# Patient Record
Sex: FEMALE | Race: WHITE
[De-identification: names, ages, dates, MRNs, and addresses within clinical notes are randomized per-mention and may not be internally consistent; named-entity substitution may affect disease eponyms.]

---

## 2017-02-14 ENCOUNTER — HOSPITAL ENCOUNTER (OUTPATIENT)
Dept: HOSPITAL 47 - RADMAMWWP | Age: 52
Discharge: HOME | End: 2017-02-14
Payer: COMMERCIAL

## 2017-02-14 DIAGNOSIS — Z12.31: Primary | ICD-10-CM

## 2017-02-15 NOTE — MM
Reason for exam: screening  (asymptomatic).

Last mammogram was performed 9 years ago.



History:

Took hormonal contraceptives for 3 years beginning at age 15.



Physical Findings:

A clinical breast exam by your physician is recommended on an annual basis and 

results should be correlated with mammographic findings.



MG Screening Mammo w CAD

Bilateral CC and MLO view(s) were taken.

Prior study comparison: February 20, 2008, CAD bilateral diagnostic mammogram.

The breast tissue is heterogeneously dense. This may lower the sensitivity of 

mammography.  No significant changes when compared with prior studies.





ASSESSMENT: Benign, BI-RAD 2



RECOMMENDATION:

Routine screening mammogram of both breasts in 1 year.

## 2017-02-24 ENCOUNTER — HOSPITAL ENCOUNTER (OUTPATIENT)
Dept: HOSPITAL 47 - LABWHC1 | Age: 52
Discharge: HOME | End: 2017-02-24
Payer: COMMERCIAL

## 2017-02-24 DIAGNOSIS — E78.5: ICD-10-CM

## 2017-02-24 DIAGNOSIS — E55.9: ICD-10-CM

## 2017-02-24 DIAGNOSIS — G60.9: Primary | ICD-10-CM

## 2017-02-24 DIAGNOSIS — M79.7: ICD-10-CM

## 2017-02-24 LAB
ALP SERPL-CCNC: 87 U/L (ref 38–126)
ALT SERPL-CCNC: 18 U/L (ref 9–52)
ANION GAP SERPL CALC-SCNC: 11 MMOL/L
AST SERPL-CCNC: 22 U/L (ref 14–36)
BASOPHILS # BLD AUTO: 0.1 K/UL (ref 0–0.2)
BASOPHILS NFR BLD AUTO: 1 %
BUN SERPL-SCNC: 9 MG/DL (ref 7–17)
CALCIUM SPEC-MCNC: 9.2 MG/DL (ref 8.4–10.2)
CH: 30
CHCM: 33.5
CHLORIDE SERPL-SCNC: 100 MMOL/L (ref 98–107)
CHOLEST SERPL-MCNC: 235 MG/DL (ref ?–200)
CO2 SERPL-SCNC: 29 MMOL/L (ref 22–30)
EOSINOPHIL # BLD AUTO: 0.1 K/UL (ref 0–0.7)
EOSINOPHIL NFR BLD AUTO: 1 %
ERYTHROCYTE [DISTWIDTH] IN BLOOD BY AUTOMATED COUNT: 4.68 M/UL (ref 3.8–5.4)
ERYTHROCYTE [DISTWIDTH] IN BLOOD: 12.2 % (ref 11.5–15.5)
GLUCOSE SERPL-MCNC: 89 MG/DL (ref 74–99)
HCT VFR BLD AUTO: 42.1 % (ref 34–46)
HDLC SERPL-MCNC: 49 MG/DL (ref 40–60)
HDW: 2.39
HEPATITIS C VIRUS IGG  INDEX: 0.01
HGB BLD-MCNC: 14.1 GM/DL (ref 11.4–16)
LUC NFR BLD AUTO: 1 %
LYMPHOCYTES # SPEC AUTO: 2.8 K/UL (ref 1–4.8)
LYMPHOCYTES NFR SPEC AUTO: 30 %
MCH RBC QN AUTO: 30 PG (ref 25–35)
MCHC RBC AUTO-ENTMCNC: 33.4 G/DL (ref 31–37)
MCV RBC AUTO: 90 FL (ref 80–100)
MONOCYTES # BLD AUTO: 0.5 K/UL (ref 0–1)
MONOCYTES NFR BLD AUTO: 6 %
NEUTROPHILS # BLD AUTO: 5.7 K/UL (ref 1.3–7.7)
NEUTROPHILS NFR BLD AUTO: 61 %
NON-AFRICAN AMERICAN GFR(MDRD): >60
POTASSIUM SERPL-SCNC: 4 MMOL/L (ref 3.5–5.1)
PROT SERPL-MCNC: 7.1 G/DL (ref 6.3–8.2)
SODIUM SERPL-SCNC: 140 MMOL/L (ref 137–145)
TRIGL SERPL-MCNC: 211 MG/DL (ref ?–150)
WBC # BLD AUTO: 0.12 10*3/UL
WBC # BLD AUTO: 9.2 K/UL (ref 3.8–10.6)
WBC (PEROX): 9.59

## 2017-02-24 PROCEDURE — 36415 COLL VENOUS BLD VENIPUNCTURE: CPT

## 2017-02-24 PROCEDURE — 84443 ASSAY THYROID STIM HORMONE: CPT

## 2017-02-24 PROCEDURE — 80061 LIPID PANEL: CPT

## 2017-02-24 PROCEDURE — 86803 HEPATITIS C AB TEST: CPT

## 2017-02-24 PROCEDURE — 80053 COMPREHEN METABOLIC PANEL: CPT

## 2017-02-24 PROCEDURE — 85025 COMPLETE CBC W/AUTO DIFF WBC: CPT

## 2017-02-24 PROCEDURE — 82306 VITAMIN D 25 HYDROXY: CPT

## 2019-03-29 ENCOUNTER — HOSPITAL ENCOUNTER (OUTPATIENT)
Dept: HOSPITAL 47 - LABWHC1 | Age: 54
Discharge: HOME | End: 2019-03-29
Attending: INTERNAL MEDICINE
Payer: COMMERCIAL

## 2019-03-29 DIAGNOSIS — E78.5: Primary | ICD-10-CM

## 2019-03-29 DIAGNOSIS — E55.9: ICD-10-CM

## 2019-03-29 LAB
ALBUMIN SERPL-MCNC: 4 G/DL (ref 3.8–4.9)
ALBUMIN/GLOB SERPL: 1.74 G/DL (ref 1.6–3.17)
ALP SERPL-CCNC: 167 U/L (ref 41–126)
ALT SERPL-CCNC: 20 U/L (ref 8–44)
ANION GAP SERPL CALC-SCNC: 8.1 MMOL/L (ref 4–12)
AST SERPL-CCNC: 28 U/L (ref 13–35)
BASOPHILS # BLD AUTO: 0 K/UL (ref 0–0.2)
BASOPHILS NFR BLD AUTO: 0 %
BUN SERPL-SCNC: 7 MG/DL (ref 9–27)
CALCIUM SPEC-MCNC: 9 MG/DL (ref 8.7–10.3)
CHLORIDE SERPL-SCNC: 102 MMOL/L (ref 96–109)
CHOLEST SERPL-MCNC: 221 MG/DL (ref 0–200)
CO2 SERPL-SCNC: 28.9 MMOL/L (ref 21.6–31.8)
EOSINOPHIL # BLD AUTO: 0.3 K/UL (ref 0–0.7)
EOSINOPHIL NFR BLD AUTO: 3 %
ERYTHROCYTE [DISTWIDTH] IN BLOOD BY AUTOMATED COUNT: 4.27 M/UL (ref 3.8–5.4)
ERYTHROCYTE [DISTWIDTH] IN BLOOD: 13.5 % (ref 11.5–15.5)
GLOBULIN SER CALC-MCNC: 2.3 G/DL (ref 1.6–3.3)
GLUCOSE SERPL-MCNC: 139 MG/DL (ref 70–110)
HCT VFR BLD AUTO: 40.7 % (ref 34–46)
HDLC SERPL-MCNC: 52 MG/DL (ref 40–60)
HGB BLD-MCNC: 13.2 GM/DL (ref 11.4–16)
LDLC SERPL CALC-MCNC: 134 MG/DL (ref 0–131)
LYMPHOCYTES # SPEC AUTO: 2.3 K/UL (ref 1–4.8)
LYMPHOCYTES NFR SPEC AUTO: 30 %
MCH RBC QN AUTO: 30.8 PG (ref 25–35)
MCHC RBC AUTO-ENTMCNC: 32.3 G/DL (ref 31–37)
MCV RBC AUTO: 95.5 FL (ref 80–100)
MONOCYTES # BLD AUTO: 0.3 K/UL (ref 0–1)
MONOCYTES NFR BLD AUTO: 4 %
NEUTROPHILS # BLD AUTO: 4.6 K/UL (ref 1.3–7.7)
NEUTROPHILS NFR BLD AUTO: 61 %
PLATELET # BLD AUTO: 532 K/UL (ref 150–450)
POTASSIUM SERPL-SCNC: 3.9 MMOL/L (ref 3.5–5.5)
PROT SERPL-MCNC: 6.3 G/DL (ref 6.2–8.2)
SODIUM SERPL-SCNC: 139 MMOL/L (ref 135–145)
TRIGL SERPL-MCNC: 175 MG/DL (ref 0–149)
VLDLC SERPL CALC-MCNC: 35 MG/DL (ref 5–40)
WBC # BLD AUTO: 7.6 K/UL (ref 3.8–10.6)

## 2019-03-29 PROCEDURE — 36415 COLL VENOUS BLD VENIPUNCTURE: CPT

## 2019-03-29 PROCEDURE — 80061 LIPID PANEL: CPT

## 2019-03-29 PROCEDURE — 82306 VITAMIN D 25 HYDROXY: CPT

## 2019-03-29 PROCEDURE — 80053 COMPREHEN METABOLIC PANEL: CPT

## 2019-03-29 PROCEDURE — 85025 COMPLETE CBC W/AUTO DIFF WBC: CPT

## 2020-03-17 ENCOUNTER — HOSPITAL ENCOUNTER (OUTPATIENT)
Dept: HOSPITAL 47 - RADMRIMAIN | Age: 55
Discharge: HOME | End: 2020-03-17
Attending: PSYCHIATRY & NEUROLOGY
Payer: COMMERCIAL

## 2020-03-17 DIAGNOSIS — M50.30: Primary | ICD-10-CM

## 2020-03-17 PROCEDURE — 72141 MRI NECK SPINE W/O DYE: CPT

## 2020-03-17 NOTE — MR
EXAMINATION TYPE: MR cervical spine wo con

 

DATE OF EXAM: 3/17/2020

 

COMPARISON: None

 

HISTORY: Neck pain

 

TECHNIQUE: 

Multiplanar, multisequence images of the cervical spine were acquired.

 

C2-C3: Uncovertebral joint hypertrophy causes some left-sided foraminal encroachment. No significant 
spinal stenosis. No disc herniation.

 

C3-C4: Left-sided foraminal encroachment is present due to uncovertebral joint hypertrophy and facet 
arthropathy. There is posterior extension endplate disc complex causing anterior mass effect on the t
hecal sac and possibly contacting the anterior cervical cord, only mild spinal stenosis.

 

C4-C5: Posterior broad-based disc bulge causes mild anterior mass effect on the thecal sac. No signif
icant spinal stenosis or foraminal encroachment.

 

C5-C6: Bilateral foraminal encroachment is present. Posterior extension endplate disc complex does ca
use some mild anterior mass effect on the thecal sac, only mild spinal stenosis.

 

C6-C7: Bilateral foraminal encroachment is present. There is a Chiba joint hypertrophy and facet arth
ropathy causing left-sided foraminal encroachment greater than right. Posterior extension endplate di
sc complex results in mild spinal stenosis, effacement of the anterior thecal sac.

 

C7-T1: No evidence for degenerative disc disease.  No disc bulge/herniation or protrusion.  No Canal 
stenosis.  Foramina are patent bilaterally.

 

Cervical segments are intact.  There is normal alignment.  Cervical spinal cord is of normal signal. 
 Craniovertebral junction relationships are within normal limits.  There is multilevel spondylosis an
d motion on the exam. Endplate discogenic marrow signal changes are present, there is loss of disc he
ight signal at intervertebral levels with relative sparing at C7-T1. There is a spinal curvature. Cys
tic lesion possibly present within the right and likely left lobe lobe of the thyroid. Inflammatory c
hanges are present within the bilateral maxillary sinuses.

 

IMPRESSION:

Degenerative disc disease, multilevel foraminal encroachment.

## 2022-07-13 ENCOUNTER — HOSPITAL ENCOUNTER (OUTPATIENT)
Dept: HOSPITAL 47 - RADXRMAIN | Age: 57
Discharge: HOME | End: 2022-07-13
Attending: FAMILY MEDICINE
Payer: COMMERCIAL

## 2022-07-13 DIAGNOSIS — K59.00: Primary | ICD-10-CM

## 2022-07-13 PROCEDURE — 74018 RADEX ABDOMEN 1 VIEW: CPT

## 2022-07-13 NOTE — XR
EXAMINATION TYPE: XR abdomen 1V

 

DATE OF EXAM: 7/13/2022 12:42 PM

 

CLINICAL HISTORY:  Lower abdominal pain.

 

TECHNIQUE:  3 supine KUB images of the abdomen are obtained.

 

COMPARISON: Chest x-ray January 5, 2016.

 

FINDINGS: Some paucity of bowel gas. Gas seen in nondistended bowel loops in the pelvis. Gas noted wi
thin prominent colon along the periphery including right transverse and left colon. Not well-seen sig
moid or rectal colon. Possible soft tissue mass with shouldering in the proximal transverse colon. Na
rrowing and sclerosis at pubic symphysis. No abnormal calcifications.

 

IMPRESSION: 

 

Overall nonspecific bowel gas pattern. One must consider possible colonic mass or neoplasm. Correlate
 clinically. Consider CT abdomen and pelvis to further evaluate.

## 2022-09-16 ENCOUNTER — HOSPITAL ENCOUNTER (OUTPATIENT)
Dept: HOSPITAL 47 - RADCTMAIN | Age: 57
Discharge: HOME | End: 2022-09-16
Attending: FAMILY MEDICINE
Payer: COMMERCIAL

## 2022-09-16 DIAGNOSIS — K42.9: ICD-10-CM

## 2022-09-16 DIAGNOSIS — K80.20: Primary | ICD-10-CM

## 2022-09-16 DIAGNOSIS — K40.30: ICD-10-CM

## 2022-09-16 LAB — BUN SERPL-SCNC: 8 MG/DL (ref 7–17)

## 2022-09-16 PROCEDURE — 36415 COLL VENOUS BLD VENIPUNCTURE: CPT

## 2022-09-16 PROCEDURE — 74177 CT ABD & PELVIS W/CONTRAST: CPT

## 2022-09-16 PROCEDURE — 82565 ASSAY OF CREATININE: CPT

## 2022-09-16 PROCEDURE — 84520 ASSAY OF UREA NITROGEN: CPT

## 2022-09-18 NOTE — CT
EXAMINATION TYPE: CT abdomen pelvis w con

CT DLP: 452.4 mGycm, Automated exposure control for dose reduction was used.

 

DATE OF EXAM: 9/16/2022 5:42 PM

 

COMPARISON:  CT abdomen pelvis most recent from 7/30/2010 

 

CLINICAL INDICATION:Female, 57 years old with history of R19.09 INTRA ABD PELVIC SWALLOW MASS LUMP; I
NTRA ABD PELVIC SWELLING MASS LUMP

 

TECHNIQUE:  Axial CT of the abdomen and pelvis. Sagittal and coronal reformats were created on a [employer] workstation. 

 

Contrast used:100ML mL of Isovue 300 with IV Contrast, 

Oral contrast used: with Oral Contrast 

 

FINDINGS: 

LOWER CHEST: Unremarkable

 

ABDOMEN

LIVER: Scattered hepatic cysts are present

GALLBLADDER AND BILE DUCTS: Layering increased densities within the lumen consistent with gallstones 
are present. Extra hepatic biliary dilation with the common bile duct measuring up to 10 mm which is 
increased from 6 mm in 2010. Mild intrahepatic biliary dilatation is also present.

PANCREAS: There is pancreatic divisum morphology. No obvious obstructing mass of the

SPLEEN: Unremarkable.

ADRENAL GLANDS: Unremarkable.

KIDNEYS AND URETERS: No evidence of hydronephrosis or renal calculus. Medial right upper kidney cyst.


 

PELVIS

BLADDER: Unremarkable

REPRODUCTIVE: The uterus is surgically absent.

 

ABDOMEN & PELVIS

STOMACH AND BOWEL: No evidence of bowel obstruction. Appendix is normal.

PERITONEUM: No evidence of pneumoperitoneum or free fluid.

 

VASCULATURE: No evidence of aortic aneurysm. 

MUSCULOSKELETAL: No acute osseous abnormalities multilevel disc degeneration changes with disc bulgin
g throughout the lumbar spine.

LYMPH NODES: No gross evidence for lymphadenopathy.

SOFT TISSUE/ABDOMINAL WALL: Left fat containing inguinal hernia. Fat-containing umbilical hernia.

 

IMPRESSION:

1.  No evidence for acute intra-abdominal process. No evidence for intraluminal mass.

2. Extra hepatic biliary dilation which has increased from 2010, additionally there is intrahepatic b
iliary dilation which is similar to 2010. Cholelithiasis is present. No obvious choledocholithiasis i
dentified. MRCP could be used to evaluate biliary system.

3. Pancreatic divisum suggested.

4. Left inguinal fat-containing hernia.

5. Fat-containing umbilical hernia.

## 2023-06-02 ENCOUNTER — HOSPITAL ENCOUNTER (OUTPATIENT)
Dept: HOSPITAL 47 - RADMAMWWP | Age: 58
Discharge: HOME | End: 2023-06-02
Attending: FAMILY MEDICINE
Payer: COMMERCIAL

## 2023-06-02 DIAGNOSIS — Z78.0: ICD-10-CM

## 2023-06-02 DIAGNOSIS — Z12.31: Primary | ICD-10-CM

## 2023-06-02 PROCEDURE — 77063 BREAST TOMOSYNTHESIS BI: CPT

## 2023-06-02 PROCEDURE — 77067 SCR MAMMO BI INCL CAD: CPT

## 2023-06-05 NOTE — MM
Reason for Exam: Screening  (asymptomatic). 

Last mammogram was performed 6 year(s) and 4 month(s) ago. 





Patient History: 

Menarche at age 11. First Full-Term Pregnancy at age 16. Left ovary removed at age 45. Right ovary

removed at age 45. Hysterectomy at age 45. Postmenopausal. Patient has history of breast feeding.

Hormonal Contraceptives for 3 years from age 15 until age 18. Benign Excisional Biopsy on the right

side. 





Risk Values: 

Shalonda 5 year model risk: 1.2%.

NCI Lifetime model risk: 6.3%.





Prior Study Comparison: 

2/20/2008 Bilateral Diagnostic Mammogram, [facility]. 2/14/2017 Bilateral Screening Mammogram, [facility]. 





Tissue Density: 

There are scattered fibroglandular densities.





Findings: 

Analyzed By CAD. 

Benign-appearing bilateral axillary lymph nodes are redemonstrated.



There is no suspicious group of microcalcifications or new suspicious mass in either breast. 





Overall Assessment: Negative, BI-RAD 1





Management: 

Screening Mammogram of both breasts in 1 year.

.



Patient should continue monthly self-breast exams.  A clinical breast exam by your physician is

recommended on an annual basis.

This exam should not preclude additional follow-up of suspicious palpable abnormalities.



Note on Shalonda scores and lifetime risk:

1. A Shalonda score greater than 3% is considered moderate risk. If this is the case, consider

specialist referral to assess eligibility for a risk reducing agent.

2. If overall lifetime risk for the development of breast cancer is 20% or higher, the patient may

qualify for future screening with alternating mammogram and breast MRI.



Electronically signed and approved by: Zenon Celestin M.D.

## 2023-09-13 ENCOUNTER — HOSPITAL ENCOUNTER (OUTPATIENT)
Dept: HOSPITAL 47 - ORWHC2ENDO | Age: 58
End: 2023-09-13
Attending: INTERNAL MEDICINE
Payer: COMMERCIAL

## 2023-09-13 VITALS — BODY MASS INDEX: 23.1 KG/M2

## 2023-09-13 VITALS — HEART RATE: 80 BPM | DIASTOLIC BLOOD PRESSURE: 76 MMHG | SYSTOLIC BLOOD PRESSURE: 106 MMHG

## 2023-09-13 VITALS — TEMPERATURE: 97 F

## 2023-09-13 VITALS — RESPIRATION RATE: 14 BRPM

## 2023-09-13 DIAGNOSIS — K20.90: Primary | ICD-10-CM

## 2023-09-13 DIAGNOSIS — F17.200: ICD-10-CM

## 2023-09-13 DIAGNOSIS — Z79.811: ICD-10-CM

## 2023-09-13 DIAGNOSIS — Z98.890: ICD-10-CM

## 2023-09-13 DIAGNOSIS — Z88.0: ICD-10-CM

## 2023-09-13 DIAGNOSIS — F31.9: ICD-10-CM

## 2023-09-13 DIAGNOSIS — G60.9: ICD-10-CM

## 2023-09-13 DIAGNOSIS — K22.70: ICD-10-CM

## 2023-09-13 PROCEDURE — 43239 EGD BIOPSY SINGLE/MULTIPLE: CPT

## 2023-09-13 PROCEDURE — 88305 TISSUE EXAM BY PATHOLOGIST: CPT

## 2023-09-13 PROCEDURE — 88312 SPECIAL STAINS GROUP 1: CPT

## 2023-09-13 NOTE — P.PCN
Date of Procedure: 09/13/23


Procedure(s) Performed: 


BRIEF HISTORY: Patient is a 58-year-old, pleasant white female scheduled for an 

upper endoscopy as a part of evaluation of nausea, decreased appetite and weight

loss of 10 pounds in the last 3 months.. 





PROCEDURE PERFORMED: Esophagogastroduodenoscopy with biopsy .





PREOPERATIVE DIAGNOSIS: .nausea/epigastric discomfort/esophagitis.  3 months 

duration 





IV sedation per anesthesia. 





PROCEDURE: After informed consent was obtained, the patient  was brought into 

the endoscopy unit. IV sedation was administered by Anesthesia under continuous 

monitoring. Initially the Olympus GIF-140 video endoscope was inserted into the 

mouth. Esophagus intubated without any difficulty. It was gradually advanced 

into the stomach and duodenum and carefully examined. The bulb and the second 

part of the duodenum appeared normal. biopsies were done from the duodenum to 

rule out celiac disease.   The scope at this time was withdrawn to the stomach, 

adequately insufflated with air, and upon careful examination, mucosa of the 

antrum, and mild mottling of the mucosa consistent with gastritis and biopsies 

were done from this area.   body, cardia and the fundus appeared normal. The 

scope was then withdrawn into the esophagus. The GE junction was located at 39 

cm from the incisors.  there was some erythema the GE junction consistent with 

LA grade a reflux esophagitis.  Also there was a 3 mm probably as appearing 

mucosa proximal to the GE junction was biopsied.  Rest of the The esophagus 

appeared normal. There were no erosions or ulcerations seen and the patient 

tolerated the procedure well. 





IMPRESSION: 


1.  Mild erythema of the GE junction with 3 mm of Mckoy's appearing mucosa 

just proximal to the GE junction status post biopsy.


2  Mild antral gastritis.





RECOMMENDATIONS: The findings of this examination were discussed with the carol ann 

as well as a family.  She was advised to follow with the biopsy results.  If the

biopsy confirms the presence of Mckoy's esophagus he can have a repeat upper 

endoscopy